# Patient Record
Sex: FEMALE | Race: WHITE | NOT HISPANIC OR LATINO | Employment: OTHER | URBAN - METROPOLITAN AREA
[De-identification: names, ages, dates, MRNs, and addresses within clinical notes are randomized per-mention and may not be internally consistent; named-entity substitution may affect disease eponyms.]

---

## 2024-08-21 ENCOUNTER — TELEPHONE (OUTPATIENT)
Dept: GASTROENTEROLOGY | Facility: CLINIC | Age: 44
End: 2024-08-21
Payer: COMMERCIAL

## 2024-08-21 NOTE — TELEPHONE ENCOUNTER
Patient never seen before. Patient was told she would need a referral. 8/21/24 LRA     ----- Message from Rhonda Zamudio sent at 8/21/2024  2:52 PM CDT -----  Regarding: np appt  Contact: pt  Pt calling for np appt for stomach issues and she can be reached at [phone]     Thanks